# Patient Record
Sex: MALE | Race: OTHER | Employment: STUDENT | URBAN - METROPOLITAN AREA
[De-identification: names, ages, dates, MRNs, and addresses within clinical notes are randomized per-mention and may not be internally consistent; named-entity substitution may affect disease eponyms.]

---

## 2018-01-31 ENCOUNTER — ESTABLISHED COMPREHENSIVE EXAM (OUTPATIENT)
Dept: URBAN - METROPOLITAN AREA CLINIC 39 | Facility: CLINIC | Age: 23
End: 2018-01-31

## 2018-01-31 DIAGNOSIS — H52.12: ICD-10-CM

## 2018-01-31 DIAGNOSIS — H52.01: ICD-10-CM

## 2018-01-31 DIAGNOSIS — H52.203: ICD-10-CM

## 2018-01-31 PROCEDURE — 92015 DETERMINE REFRACTIVE STATE: CPT

## 2018-01-31 PROCEDURE — 92014 COMPRE OPH EXAM EST PT 1/>: CPT

## 2018-01-31 ASSESSMENT — VISUAL ACUITY
OD_CC: J2
OD_CC: 20/30-1
OS_SC: 20/25-1
OD_SC: J3
OS_SC: J1
OD_SC: 20/100

## 2018-01-31 ASSESSMENT — TONOMETRY
OS_IOP_MMHG: 16
OD_IOP_MMHG: 15

## 2018-01-31 ASSESSMENT — KERATOMETRY
OS_AXISANGLE_DEGREES: 164
OD_AXISANGLE2_DEGREES: 078
OS_AXISANGLE2_DEGREES: 074
OD_AXISANGLE_DEGREES: 168
OS_K1POWER_DIOPTERS: 42.50
OD_K2POWER_DIOPTERS: 43.75
OS_K2POWER_DIOPTERS: 43.00
OD_K1POWER_DIOPTERS: 41.50

## 2018-12-10 NOTE — PATIENT DISCUSSION
Dry Eye Syndrome Counseling: I have discussed the diagnosis and the pathophysiology of this disease with the patient. Eyelid pathology and systemic illnesses such as Sjogre?s disease or rheumatoid arthritis may contribute to severity. Vision may be limited by dry eye, and symptoms exacerbated by environmental factors such as smoke, wind, or prolonged eye use. Lifestyle habits and environmental factors contributing to dry eyes have been reviewed with the patient. Daily prescribed and over the counter medications, along with their potential contributions to dry eye symptoms, have been discussed. Treatment options include, but are not limited to, artificial tears, punctal plugs, topical cyclosporine, oral omega-3 supplements, Lipiflow, moisture goggles, and lubricating ointments. I stressed the importance of compliance with treatment.

## 2018-12-10 NOTE — PATIENT DISCUSSION
K SICCA OU: COULD NOT GET RESTASIS DUE TO COST. LIMITED IMPROVEMENT ON LOTEMAX. PRESERVATIVE FREE ARTIFICIAL TEARS 4-6X A DAY, OU AND THE DAILY INTAKE OF OMEGA-3 DHA/EPA FATTY ACIDS TO HELP RELIEVE SYMPTOMS. ADD NIGHTLY LUBRICATING OINTMENT OR GEL. ADD LL PUNCTAL PLUGS TODAY. RETURN FOR FOLLOW-UP AS SCHEDULED OR SOONER IF SYMPTOMS WORSEN.

## 2018-12-10 NOTE — PATIENT DISCUSSION
Stopped Today: Restasis MultiDose (cyclosporine): drops: 0.05% 1 drop twice a day into both eyes 09-

## 2019-10-11 NOTE — PATIENT DISCUSSION
Considering Surgery Counseling: I have discussed the option of scheduling surgery versus following, as well as the risks, benefits and alternatives of cataract surgery with the patient. It was explained that the surgery is elective, there is no rush and there is no harm in waiting to have surgery. It was also explained that there is no guarantee that removing the cataract will improve their vision. The patient understands and desires to follow for now and will consider his/her options.

## 2019-10-11 NOTE — PATIENT DISCUSSION
K SICCA OU: S/P LL PLUGS OU CONTINUE PRESERVATIVE FREE ARTIFICIAL TEARS 4-6X A DAY, OU AND THE DAILY INTAKE OF OMEGA-3 DHA/EPA FATTY ACIDS TO HELP RELIEVE SYMPTOMS. ADD NIGHTLY LUBRICATING OINTMENT OR GEL.

## 2019-10-11 NOTE — PATIENT DISCUSSION
HYPERTENSIVE RETINOPATHY OU: IMPORTANCE OF GOOD BLOOD PRESSURE CONTROL STRESSED.  KEEP FU WITH PCP AND AS

## 2020-02-13 ENCOUNTER — ESTABLISHED COMPREHENSIVE EXAM (OUTPATIENT)
Dept: URBAN - METROPOLITAN AREA CLINIC 39 | Facility: CLINIC | Age: 25
End: 2020-02-13

## 2020-02-13 DIAGNOSIS — H02.402: ICD-10-CM

## 2020-02-13 DIAGNOSIS — H52.01: ICD-10-CM

## 2020-02-13 DIAGNOSIS — H52.203: ICD-10-CM

## 2020-02-13 DIAGNOSIS — H53.001: ICD-10-CM

## 2020-02-13 DIAGNOSIS — H52.12: ICD-10-CM

## 2020-02-13 PROCEDURE — 92015 DETERMINE REFRACTIVE STATE: CPT

## 2020-02-13 PROCEDURE — 92310-1 LEVEL 1 CONTACT LENS MANAGEMENT

## 2020-02-13 PROCEDURE — 92014 COMPRE OPH EXAM EST PT 1/>: CPT

## 2020-02-13 ASSESSMENT — VISUAL ACUITY
OD_CC: J5 CL
OU_CC: J1+
OU_SC: J1+
OU_CC: 20/25-2
OD_CC: 20/50 CL
OU_SC: 20/20-1
OD_SC: J6
OS_SC: 20/20-1
OD_SC: 20/30
OS_SC: J1+

## 2020-02-13 ASSESSMENT — TONOMETRY
OD_IOP_MMHG: 16
OS_IOP_MMHG: 16

## 2020-02-13 ASSESSMENT — KERATOMETRY
OS_K2POWER_DIOPTERS: 43.00
OS_AXISANGLE2_DEGREES: 074
OS_K1POWER_DIOPTERS: 42.50
OD_K1POWER_DIOPTERS: 41.75
OD_AXISANGLE2_DEGREES: 82
OS_AXISANGLE_DEGREES: 164
OD_K2POWER_DIOPTERS: 44
OD_AXISANGLE_DEGREES: 172

## 2020-12-14 NOTE — PATIENT DISCUSSION
PIGMENT DISPERSION SYNDROME, OU:  ELEVATED INTRAOCULAR PRESSURE. OFFERED SLT VERSUS DROPS. PROCEED WITH SLT. RETURN FOR IOP CHECK AS SCHEDULED.

## 2020-12-14 NOTE — PATIENT DISCUSSION
Pigmentary Glaucoma Counseling:  I have explained to the patient at length regarding the diagnosis of Pigmentary glaucoma its pathophysiology and it's potential for vision loss. I discussed the treatment options to include: medications, laser treatments, and surgery along with the risks and benefits of each. Follow-up as scheduled.

## 2021-02-08 NOTE — PATIENT DISCUSSION
PIGMENT DISPERSION SYNDROME, OU: IMPROVED INTRAOCULAR PRESSURE AFTER SLT. RETURN FOR IOP CHECK AS SCHEDULED.

## 2021-02-16 ENCOUNTER — PREPPED CHART (OUTPATIENT)
Dept: URBAN - METROPOLITAN AREA CLINIC 39 | Facility: CLINIC | Age: 26
End: 2021-02-16

## 2021-02-16 ASSESSMENT — KERATOMETRY
OD_K1POWER_DIOPTERS: 41.75
OS_AXISANGLE2_DEGREES: 074
OD_AXISANGLE_DEGREES: 172
OS_K2POWER_DIOPTERS: 43.00
OS_K1POWER_DIOPTERS: 42.50
OD_K2POWER_DIOPTERS: 44
OD_AXISANGLE2_DEGREES: 82
OS_AXISANGLE_DEGREES: 164

## 2022-03-21 NOTE — PATIENT DISCUSSION
The patient has hypertensive retinopathy, a disorder of the retina resulting from damaged retinal blood vessels secondary to high blood pressure. Most cases require no treatment other than to control the underlying hypertension. Diagnosis, treatment and follow-up recommendations have been discussed with the patient. Computed Treatment Time In Min (Will Render The Same As Calculated Treatment Time If Left Blank): 0.43

## 2022-04-22 NOTE — PATIENT DISCUSSION
Educated patient on findings. Visually significant with reduced but stable BCVA. Refer back to Dr. Thanh Prasad for cataract evaluation.